# Patient Record
(demographics unavailable — no encounter records)

---

## 2025-05-07 NOTE — DISCUSSION/SUMMARY
[FreeTextEntry1] :  GYN Annual evaluation today -pap smear sent TVS done today- AUB We discussed -- Menorrhagia, Pelvic pain, Adnexal fullness and Fibroid uterus causes and treatment options. We discussed her options to help relieve her heavy and AUB symptoms. Patient agrees for Mirena IUD.  Patient verbalized understanding of all explanations, and her questions were answered, and all concerns were addressed. Patient was screened for depression - no signs of clinical depression. PHQ-2 on file Rx given for Pelvic MRI, mammogram and B sonogram RTO in 6-8 weeks for MRI results, Mirena IUD insertion and EMB   I, Reed vernon acting as scribe for Dr. Bradford. 05/07/2025   The documentation recorded by the scribe, in my presence, accurately reflects the service I personally performed, and the decisions made by me with my edits as appropriate on 05/07/2025  Lety Bradford MD, FACOG

## 2025-05-07 NOTE — HISTORY OF PRESENT ILLNESS
[Patient reported mammogram was normal] : Patient reported mammogram was normal [Patient reported breast sonogram was normal] : Patient reported breast sonogram was normal [Patient reported PAP Smear was normal] : Patient reported PAP Smear was normal [Tubal Occlusion] : has had a tubal occlusion [Y] : Patient is sexually active [No] : Patient does not have concerns regarding sex [FreeTextEntry1] : GYN Annual [TextBox_4] : 49YO patient presents for GYN annual check-up. Hx of fibroid uterus. Patient c/o heavy menses/ menorrhagia. [Mammogramdate] : 10/24/23 [TextBox_19] : Tc=9.9% [BreastSonogramDate] : 10/24/23 [TextBox_25] : Tc=9.9% [PapSmeardate] : 1/2/24 [HPVDate] : 1/2/24 [LMPDate] : 4/17/25